# Patient Record
Sex: FEMALE | Race: WHITE | NOT HISPANIC OR LATINO | URBAN - METROPOLITAN AREA
[De-identification: names, ages, dates, MRNs, and addresses within clinical notes are randomized per-mention and may not be internally consistent; named-entity substitution may affect disease eponyms.]

---

## 2023-05-02 ENCOUNTER — OFFICE (OUTPATIENT)
Dept: URBAN - METROPOLITAN AREA CLINIC 27 | Facility: CLINIC | Age: 23
End: 2023-05-02

## 2023-05-02 VITALS
WEIGHT: 168 LBS | TEMPERATURE: 97.3 F | HEART RATE: 60 BPM | HEIGHT: 62 IN | DIASTOLIC BLOOD PRESSURE: 93 MMHG | BODY MASS INDEX: 30.91 KG/M2 | SYSTOLIC BLOOD PRESSURE: 132 MMHG

## 2023-05-02 DIAGNOSIS — R11.0 NAUSEA: ICD-10-CM

## 2023-05-02 DIAGNOSIS — R12 HEARTBURN: ICD-10-CM

## 2023-05-02 DIAGNOSIS — R10.30 LOWER ABDOMINAL PAIN, UNSPECIFIED: ICD-10-CM

## 2023-05-02 PROCEDURE — 99213 OFFICE O/P EST LOW 20 MIN: CPT

## 2024-03-18 ENCOUNTER — HOSPITAL ENCOUNTER (EMERGENCY)
Age: 24
Discharge: HOME | End: 2024-03-18
Attending: EMERGENCY MEDICINE
Payer: COMMERCIAL

## 2024-03-18 ENCOUNTER — APPOINTMENT (OUTPATIENT)
Dept: CT IMAGING | Age: 24
End: 2024-03-18
Payer: COMMERCIAL

## 2024-03-18 VITALS
SYSTOLIC BLOOD PRESSURE: 122 MMHG | TEMPERATURE: 98.4 F | WEIGHT: 170 LBS | HEART RATE: 67 BPM | BODY MASS INDEX: 31.28 KG/M2 | HEIGHT: 62 IN | DIASTOLIC BLOOD PRESSURE: 82 MMHG | OXYGEN SATURATION: 96 % | RESPIRATION RATE: 18 BRPM

## 2024-03-18 DIAGNOSIS — I10 ESSENTIAL HYPERTENSION: ICD-10-CM

## 2024-03-18 DIAGNOSIS — R42 DIZZINESS: Primary | ICD-10-CM

## 2024-03-18 LAB
ALBUMIN SERPL-MCNC: 4.3 G/DL (ref 3.5–5.2)
ALBUMIN/GLOB SERPL: 1.4 {RATIO} (ref 1–2.5)
ALP SERPL-CCNC: 60 U/L (ref 35–104)
ALT SERPL-CCNC: 27 U/L (ref 5–33)
ANION GAP SERPL CALCULATED.3IONS-SCNC: 10 MMOL/L (ref 9–17)
AST SERPL-CCNC: 14 U/L
BASOPHILS # BLD: 0.1 K/UL (ref 0–0.2)
BASOPHILS NFR BLD: 1 % (ref 0–2)
BILIRUB SERPL-MCNC: 0.2 MG/DL (ref 0.3–1.2)
BILIRUB UR QL STRIP: NEGATIVE
BUN SERPL-MCNC: 16 MG/DL (ref 6–20)
CALCIUM SERPL-MCNC: 9.8 MG/DL (ref 8.6–10.4)
CHLORIDE SERPL-SCNC: 104 MMOL/L (ref 98–107)
CLARITY UR: CLEAR
CO2 SERPL-SCNC: 28 MMOL/L (ref 20–31)
COLOR UR: YELLOW
COMMENT: NORMAL
CREAT SERPL-MCNC: 0.8 MG/DL (ref 0.5–0.9)
EOSINOPHIL # BLD: 0.4 K/UL (ref 0–0.4)
EOSINOPHILS RELATIVE PERCENT: 4 % (ref 1–4)
ERYTHROCYTE [DISTWIDTH] IN BLOOD BY AUTOMATED COUNT: 13.1 % (ref 12.5–15.4)
GFR SERPL CREATININE-BSD FRML MDRD: >60 ML/MIN/1.73M2
GLUCOSE SERPL-MCNC: 102 MG/DL (ref 70–99)
GLUCOSE UR STRIP-MCNC: NEGATIVE MG/DL
HCG SERPL QL: NEGATIVE
HCT VFR BLD AUTO: 38.8 % (ref 36–46)
HGB BLD-MCNC: 13.1 G/DL (ref 12–16)
HGB UR QL STRIP.AUTO: NEGATIVE
KETONES UR STRIP-MCNC: NEGATIVE MG/DL
LEUKOCYTE ESTERASE UR QL STRIP: NEGATIVE
LIPASE SERPL-CCNC: 19 U/L (ref 13–60)
LYMPHOCYTES NFR BLD: 3.1 K/UL (ref 1–4.8)
LYMPHOCYTES RELATIVE PERCENT: 31 % (ref 25–45)
MAGNESIUM SERPL-MCNC: 2.1 MG/DL (ref 1.6–2.6)
MCH RBC QN AUTO: 31.2 PG (ref 26–34)
MCHC RBC AUTO-ENTMCNC: 33.9 G/DL (ref 31–37)
MCV RBC AUTO: 92.2 FL (ref 80–100)
MONOCYTES NFR BLD: 0.8 K/UL (ref 0.1–1.4)
MONOCYTES NFR BLD: 8 % (ref 2–8)
NEUTROPHILS NFR BLD: 56 % (ref 34–64)
NEUTS SEG NFR BLD: 5.7 K/UL (ref 1.8–7.7)
NITRITE UR QL STRIP: NEGATIVE
PH UR STRIP: 6 [PH] (ref 5–8)
PLATELET # BLD AUTO: 459 K/UL (ref 140–450)
PMV BLD AUTO: 8.2 FL (ref 6–12)
POTASSIUM SERPL-SCNC: 3.9 MMOL/L (ref 3.7–5.3)
PROT SERPL-MCNC: 7.3 G/DL (ref 6.4–8.3)
PROT UR STRIP-MCNC: NEGATIVE MG/DL
RBC # BLD AUTO: 4.2 M/UL (ref 4–5.2)
SODIUM SERPL-SCNC: 142 MMOL/L (ref 135–144)
SP GR UR STRIP: 1.02 (ref 1–1.03)
UROBILINOGEN UR STRIP-ACNC: NORMAL EU/DL (ref 0–1)
WBC OTHER # BLD: 10.1 K/UL (ref 4.5–13.5)

## 2024-03-18 PROCEDURE — 83690 ASSAY OF LIPASE: CPT

## 2024-03-18 PROCEDURE — 36415 COLL VENOUS BLD VENIPUNCTURE: CPT

## 2024-03-18 PROCEDURE — 2580000003 HC RX 258: Performed by: EMERGENCY MEDICINE

## 2024-03-18 PROCEDURE — 70450 CT HEAD/BRAIN W/O DYE: CPT

## 2024-03-18 PROCEDURE — 80053 COMPREHEN METABOLIC PANEL: CPT

## 2024-03-18 PROCEDURE — 84703 CHORIONIC GONADOTROPIN ASSAY: CPT

## 2024-03-18 PROCEDURE — 83735 ASSAY OF MAGNESIUM: CPT

## 2024-03-18 PROCEDURE — 85025 COMPLETE CBC W/AUTO DIFF WBC: CPT

## 2024-03-18 PROCEDURE — 99284 EMERGENCY DEPT VISIT MOD MDM: CPT

## 2024-03-18 PROCEDURE — 81003 URINALYSIS AUTO W/O SCOPE: CPT

## 2024-03-18 RX ORDER — 0.9 % SODIUM CHLORIDE 0.9 %
1000 INTRAVENOUS SOLUTION INTRAVENOUS ONCE
Status: COMPLETED | OUTPATIENT
Start: 2024-03-18 | End: 2024-03-18

## 2024-03-18 RX ADMIN — SODIUM CHLORIDE 1000 ML: 9 INJECTION, SOLUTION INTRAVENOUS at 07:32

## 2024-03-18 ASSESSMENT — ENCOUNTER SYMPTOMS
SORE THROAT: 0
EYE PAIN: 0
VOMITING: 0
BACK PAIN: 0
RHINORRHEA: 0
NAUSEA: 1
SHORTNESS OF BREATH: 0
ABDOMINAL PAIN: 0
PHOTOPHOBIA: 0
DIARRHEA: 0

## 2024-03-18 ASSESSMENT — PAIN - FUNCTIONAL ASSESSMENT: PAIN_FUNCTIONAL_ASSESSMENT: NONE - DENIES PAIN

## 2024-03-18 NOTE — DISCHARGE INSTRUCTIONS
PLEASE RETURN TO THE EMERGENCY DEPARTMENT IMMEDIATELY if your symptoms worsen in anyway or in 1-2 days if not improved for re-evaluation.  You should immediately return to the ER for symptoms such as new or worsening pain, difficulty breathing or swallowing, a change in your voice, a feeling of passing out, light headed, dizziness, chest pain, headache, neck pain, rash, abdominal pain or vomiting.    Take your medication as indicated and prescribed.  If you are given an antibiotic then, make sure you get the prescription filled and take the antibiotics until finished.      Please understand that at this time there is no evidence for a more serious underlying process, but that early in the process of an illness or injury, an emergency department workup can be falsely reassuring.  You should contact your family doctor within the next 48 hours for a follow up appointment.        THANK YOU!!!    From [facility] and [facility]    On behalf of the Emergency Department staff at [facility], I would like to thank you for giving us the opportunity to address your health care needs and concerns.    We hope that during your visit, our service was delivered in a professional and caring manner. Please keep [facility] in mind as we walk with you down the path to your own personal wellness.     Please expect an automated text message or email from us so we can ask a few questions about your health and progress. Based on your answers, a clinician may call you back to offer help and instructions.    Please understand that early in the process of an illness or injury, an emergency department workup can be falsely reassuring.  If you notice any worsening, changing or persistent symptoms please call your family doctor or return to the ER immediately.     Tell us how we did during your visit at [email or web address]   and let us know about your experience

## 2024-03-18 NOTE — ED PROVIDER NOTES
[facility] Emergency Department  [address]  [place] [address]  Phone: [phone]  Fax: [phone]    EMERGENCY DEPARTMENT ENCOUNTER          Pt Name: Emy Bryant  MRN: [id number]  Birthdate 2000  Date of evaluation: 3/18/2024      CHIEF COMPLAINT       Chief Complaint   Patient presents with    Dizziness     Patient reports dizziness that started roughly 24 hours ago. States she feels like she's going to pass out but hasn't. Denies any falls or trauma to the head        HISTORY OF PRESENT ILLNESS       Emy Bryant is a 23 y.o. female who presents with lightheaded/dizziness for the past few days.  Also reports intermittent twitching to her upper extremities primarily.  No vision changes.  No focal neurologic deficits otherwise such as numbness/weakness/paresthesias.  Does have a history of ocular migraines but states no appreciable cephalgia.  No recent trauma.  States she was on spring break just recently.  Also reports increased frequency in addition to intermittent nausea.  She does not know if she is pregnant at this time.  No abdominal pain or other GI symptoms.  No syncope.  No seizures.  No other symptoms or concerns.  No history of any neurologic diseases.  No recent medication changes.    REVIEW OF SYSTEMS       Review of Systems   Constitutional:  Negative for chills, fatigue and fever.   HENT:  Negative for rhinorrhea and sore throat.    Eyes:  Negative for photophobia, pain and visual disturbance.   Respiratory:  Negative for cough and shortness of breath.    Cardiovascular:  Negative for chest pain.   Gastrointestinal:  Positive for nausea. Negative for abdominal pain, diarrhea and vomiting.   Genitourinary:  Positive for frequency. Negative for difficulty urinating.   Musculoskeletal:  Negative for back pain and neck pain.   Skin:  Negative for rash.   Neurological:  Positive for light-headedness. Negative for seizures, syncope, facial asymmetry,

## 2025-08-24 ENCOUNTER — OFFICE VISIT (OUTPATIENT)
Dept: URGENT CARE | Age: 25
End: 2025-08-24
Payer: COMMERCIAL

## 2025-08-24 VITALS
DIASTOLIC BLOOD PRESSURE: 86 MMHG | OXYGEN SATURATION: 97 % | BODY MASS INDEX: 34.04 KG/M2 | TEMPERATURE: 98.1 F | HEART RATE: 101 BPM | SYSTOLIC BLOOD PRESSURE: 130 MMHG | WEIGHT: 185 LBS | HEIGHT: 62 IN | RESPIRATION RATE: 18 BRPM

## 2025-08-24 DIAGNOSIS — H57.9 EYE SYMPTOM: Primary | ICD-10-CM

## 2025-08-24 PROCEDURE — 1036F TOBACCO NON-USER: CPT | Performed by: PHYSICIAN ASSISTANT

## 2025-08-24 PROCEDURE — 99070 SPECIAL SUPPLIES PHYS/QHP: CPT | Performed by: PHYSICIAN ASSISTANT

## 2025-08-24 PROCEDURE — 99214 OFFICE O/P EST MOD 30 MIN: CPT | Performed by: PHYSICIAN ASSISTANT

## 2025-08-24 PROCEDURE — 3008F BODY MASS INDEX DOCD: CPT | Performed by: PHYSICIAN ASSISTANT

## 2025-08-24 RX ORDER — DROSPIRENONE AND ETHINYL ESTRADIOL 0.03MG-3MG
1 KIT ORAL
COMMUNITY
Start: 2025-07-11

## 2025-08-24 RX ORDER — CETIRIZINE HYDROCHLORIDE 10 MG/1
TABLET ORAL EVERY 24 HOURS
COMMUNITY

## 2025-08-24 RX ORDER — CIPROFLOXACIN HYDROCHLORIDE 3 MG/ML
1 SOLUTION/ DROPS OPHTHALMIC 3 TIMES DAILY
Qty: 5 ML | Refills: 0 | Status: SHIPPED | OUTPATIENT
Start: 2025-08-24 | End: 2025-08-31